# Patient Record
Sex: MALE
[De-identification: names, ages, dates, MRNs, and addresses within clinical notes are randomized per-mention and may not be internally consistent; named-entity substitution may affect disease eponyms.]

---

## 2019-03-28 ENCOUNTER — HOSPITAL ENCOUNTER (EMERGENCY)
Dept: HOSPITAL 25 - UCEAST | Age: 19
Discharge: HOME | End: 2019-03-28
Payer: COMMERCIAL

## 2019-03-28 DIAGNOSIS — F12.90: ICD-10-CM

## 2019-03-28 DIAGNOSIS — H66.93: ICD-10-CM

## 2019-03-28 DIAGNOSIS — H60.91: Primary | ICD-10-CM

## 2019-03-28 PROCEDURE — G0463 HOSPITAL OUTPT CLINIC VISIT: HCPCS

## 2019-03-28 PROCEDURE — 99202 OFFICE O/P NEW SF 15 MIN: CPT

## 2019-03-28 NOTE — UC
Ear Complaint HPI





- HPI Summary


HPI Summary: 





19 y/o male presents to the urgent care c/o B/L ear pain for the past 2 days/ 

Pt report symptoms started w/ common cold, nasal congestion, the left ear pain. 

Yesterday RT ear pain, but he couldn't sleep. Mother applied acetic acid on his 

ear w/o any improvement. He has  low grade subjective fever at home yesterday. 

Pt w/ Hx of recurrent ear infection in the past. Pain sharp now 8/10, specially 

in Rt ear w/ decrease hearing. Pt denies dizziness, tinnitus, HA, SOB, cough, 

chest pain, abdominal pain, N/V/d. 








- History of Current Complaint


Chief Complaint: UCEar


Stated Complaint: EAR PAIN


Time Seen by Provider: 03/28/19 10:49


Hx Obtained From: Patient


Onset/Duration: Gradual Onset, Lasting Days - 2 days, Still Present, Worse 

Since - today


Severity Initially: Mild


Severity Currently: Moderate


Pain Intensity: 8


Pain Scale Used: 0-10 Numeric


Aggravating Factors: Other - touch Rt ear


Alleviating Factors: OTC Meds


Associated Signs/Symptoms: Positive: Hearing Loss, URI Symptoms





- Allergies/Home Medications


Allergies/Adverse Reactions: 


 Allergies











Allergy/AdvReac Type Severity Reaction Status Date / Time


 


No Known Allergies Allergy   Verified 03/28/19 10:32











Home Medications: 


 Home Medications





Multivitamin [Multivitamins] 1 cap PO DAILY WITH MEAL 03/28/19 [History 

Confirmed 03/28/19]











PMH/Surg Hx/FS Hx/Imm Hx


Previously Healthy: Yes


Other Respiratory History: recurrent ear infections





- Surgical History


Surgical History: None





- Family History


Known Family History: Positive: None - Pt denies FMHX





- Social History


Occupation: Student


Lives: With Family


Alcohol Use: Occasionally


Substance Use Type: Marijuana


Smoking Status (MU): Never Smoked Tobacco





- Immunization History


Vaccination Up to Date: Yes





Review of Systems


All Other Systems Reviewed And Are Negative: Yes


Constitutional: Positive: Fever - low grade fever at home


Skin: Positive: Negative


Eyes: Positive: Negative


ENT: Positive: Ear Ache - B/L ear pain R>LF, Nasal Discharge - yellowish, Sinus 

Congestion


Respiratory: Positive: Negative


Cardiovascular: Positive: Negative


Gastrointestinal: Positive: Negative


Genitourinary: Positive: Negative


Motor: Positive: Negative


Neurovascular: Positive: Negative


Musculoskeletal: Positive: Negative


Neurological: Positive: Negative


Psychological: Positive: Negative


Is Patient Immunocompromised?: No





Physical Exam





- Summary


Physical Exam Summary: 





Vital signs: reviewed


General: well developed, well nourished male sitting in the examining table w/o 

any apparent distress


Skin: Pink, warm and dry, no evidence of atopic dermatitis, psoriasis, 

seborrhea.


HEENT:


-Head: atraumatic, non tender; no scalp dermatitis.


-Eyes: sclera and conjunctiva clear, PERRLA, EOMI


-Ears: no pre- or postauricular lymphadenopathy or erythema; RT external ear 

canal with erythema and yellowish purulent discharge, pinna tenderness on 

palpation, Rt TM injected w/ erythema, LF external ear canal clear and LF TM 

injected w/ erythema  No perforation.


-Nose/Face: erythematous and edematous nasal mucosa with clear rhinorrhea, no 

frontal or maxillary sinus tender to palpation.


-Mouth/Throat: Mucous membrane moist, posterior pharynx clear, no erythema or 

exudates.


Neck: supple, FROM, nontender, no lymphadenopathy, no meningismus.


Chest: Clear to auscultation, normal breath sounds


Abd: soft, Bowel sounds active, Nontender.


Back: no spinal or CVAT


Neuro: A&O x4, GCS 15, no focal neuro deficits, normal behavior for age.





Triage Information Reviewed: Yes


Vital Signs: 


 Initial Vital Signs











Temp  98.9 F   03/28/19 10:27


 


Pulse  56   03/28/19 10:27


 


Resp  58   03/28/19 10:27


 


BP  117/75   03/28/19 10:27


 


Pulse Ox  100   03/28/19 10:27














Ear Complaint Course/Dx





- Course


Course Of Treatment: 


19 y/o male presents to the urgent care c/o B/L ear pain for the past 2 days/ 

Pt report symptoms started w/ common cold, nasal congestion, the left ear pain. 

Yesterday RT ear pain, but he couldn't sleep. Mother applied acetic acid on his 

ear w/o any improvement. He has  low grade subjective fever at home yesterday. 

Pt w/ Hx of recurrent ear infection in the past. Pain sharp now 8/10, specially 

in Rt ear w/ decrease hearing. Pt denies dizziness, tinnitus, HA, SOB, cough, 

chest pain, abdominal pain, N/V/d. Pt w/RT external ear canal with erythema and 

yellowish purulent discharge, pinna tenderness on palpation, Rt TM injected w/ 

erythema, LF external ear canal clear and LF TM injected w/ erythema  No 

perforation on examination. Pt given Ibuprofen PO at the clinic by nurse to 

alleviate otalgia. Pt Rx Amoxicillin PO, Cortisporin otic drops  and Ibuprofen 

PO as directed below. PT Advised if symptoms do not improve or worsen to return 

to the urgent care or f/u with PCP in 3 days for further management. PT  

understood and agreed with plan of care.








- Differential Dx/Diagnosis


Differential Diagnosis/HQI/PQRI: Cerumen Impaction, Otitis Externa, Otitis Media

, Perforated TM, Pharyngitis, URI


Provider Diagnosis: 


 Right otitis externa, Bilateral otitis media








Discharge





- Sign-Out/Discharge


Documenting (check all that apply): Patient Departure - D/c home


All imaging exams completed and their final reports reviewed: No Studies





- Discharge Plan


Condition: Stable


Disposition: HOME


Prescriptions: 


Amoxicillin PO (*) [Amoxicillin 875 MG (*)] 875 mg PO BID #20 tab


Ibuprofen TAB* [Motrin TAB* 600 MG] 600 mg PO Q8H PRN #30 tab


 PRN Reason: otalgia


Neomyc/Polym/HC 1% OTIC SUSP* [Cortisporin Otic Susp 1%*] 4 drop RIGHT EAR TID #

1 btl


Patient Education Materials:  Ear Infection (ED)


Forms:  *School Release


Referrals: 


[facility] [place] [facility] [[place]]


Additional Instructions: 


1-Please apply otic antibiotic on your Rt ear as directed. Also take 

Amoxicillin PO as directed to alleviate B/L ear infection


2-Take ibuprofen PO 600mg q6-8hrs  after meals for pain. 


3-If symptoms do not improve or worsen please f/u with your PCP in 3 days or 

return to the urgent care for further evaluation and treatment.











- Billing Disposition and Condition


Condition: STABLE


Disposition: Home